# Patient Record
Sex: MALE | Race: WHITE | Employment: OTHER | URBAN - METROPOLITAN AREA
[De-identification: names, ages, dates, MRNs, and addresses within clinical notes are randomized per-mention and may not be internally consistent; named-entity substitution may affect disease eponyms.]

---

## 2019-11-05 ENCOUNTER — DOCUMENTATION ONLY (OUTPATIENT)
Dept: ONCOLOGY | Age: 69
End: 2019-11-05

## 2019-11-05 ENCOUNTER — TELEPHONE (OUTPATIENT)
Dept: ONCOLOGY | Age: 69
End: 2019-11-05

## 2019-11-05 NOTE — TELEPHONE ENCOUNTER
Mailed medical records request to patient. Address verified. Patient will mail back form and aware we will send to medical records dept. Patient also stated will make appt once this is taken care of.

## 2020-08-06 ENCOUNTER — TELEPHONE (OUTPATIENT)
Dept: ONCOLOGY | Age: 70
End: 2020-08-06

## 2020-08-06 NOTE — TELEPHONE ENCOUNTER
Pt called requesting medical records from 1994 up to present. Advised him please include the year on his request.  He previously sent a med release form, but he said he is going to send a new one. Pt has our fax# and my full name and will fax it to me Monday 8/10/20. I will be on PTO so I will check it Tue when I come in to office.

## 2021-02-18 ENCOUNTER — TELEPHONE (OUTPATIENT)
Dept: ONCOLOGY | Age: 71
End: 2021-02-18

## 2021-02-18 NOTE — TELEPHONE ENCOUNTER
Pt asked how he can get medical records. He wants all medical records for an . Verified his ph & mailing address. Explnd we will mail him a release form and he must complete it and return it and his records will be mailed to whoever he tell us to on his release.    I have no mail service in [place], so I am sending request to [facility]

## 2024-04-10 NOTE — PROGRESS NOTES
HUA ARRIAGA A PT OF DR COHEN  WANT[employer] COPY OF ORIG DX FOR NON-HODGKINS LYMPHOMA NEEDS IT FOR HIS     PT WAS DX IN JULY 1994 PER MARTIN KRUSE'S OFFICE NOTES OF 09/26/2013 IN [place]. CALLED PT, BUT [facility] NOT SET UP, UNABLE TO LEAVE VMS. IF PATIENT CALLS, PLEASE ADVISE HIM THAT WE CANNOT RELEASE MEDICAL RECORDS UNTIL WE RCV MED RELEASE FORM. THE FORM NEEDS TO BE SPECIFIC IN HOW FAR BACK RECORDS ARE NEEDED. All discharge information reviewed with patient including pain, safety, medication and follow up care . Pt acknowledges understanding of discharge instructions.